# Patient Record
Sex: FEMALE | Race: WHITE | NOT HISPANIC OR LATINO | Employment: STUDENT | URBAN - METROPOLITAN AREA
[De-identification: names, ages, dates, MRNs, and addresses within clinical notes are randomized per-mention and may not be internally consistent; named-entity substitution may affect disease eponyms.]

---

## 2022-07-20 ENCOUNTER — LAB VISIT (OUTPATIENT)
Dept: LAB | Facility: OTHER | Age: 22
End: 2022-07-20
Attending: NURSE PRACTITIONER
Payer: MEDICAID

## 2022-07-20 ENCOUNTER — PATIENT MESSAGE (OUTPATIENT)
Dept: OBSTETRICS AND GYNECOLOGY | Facility: CLINIC | Age: 22
End: 2022-07-20

## 2022-07-20 ENCOUNTER — OFFICE VISIT (OUTPATIENT)
Dept: OBSTETRICS AND GYNECOLOGY | Facility: CLINIC | Age: 22
End: 2022-07-20
Payer: MEDICAID

## 2022-07-20 VITALS
HEIGHT: 67 IN | DIASTOLIC BLOOD PRESSURE: 87 MMHG | SYSTOLIC BLOOD PRESSURE: 137 MMHG | WEIGHT: 222.69 LBS | BODY MASS INDEX: 34.95 KG/M2

## 2022-07-20 DIAGNOSIS — R53.83 FATIGUE, UNSPECIFIED TYPE: ICD-10-CM

## 2022-07-20 DIAGNOSIS — N92.1 MENORRHAGIA WITH IRREGULAR CYCLE: ICD-10-CM

## 2022-07-20 DIAGNOSIS — E66.9 OBESITY (BMI 30.0-34.9): ICD-10-CM

## 2022-07-20 DIAGNOSIS — R11.0 NAUSEA: ICD-10-CM

## 2022-07-20 DIAGNOSIS — L65.9 THINNING HAIR: ICD-10-CM

## 2022-07-20 DIAGNOSIS — E28.2 PCOS (POLYCYSTIC OVARIAN SYNDROME): ICD-10-CM

## 2022-07-20 DIAGNOSIS — N94.6 DYSMENORRHEA: Primary | ICD-10-CM

## 2022-07-20 LAB
ALBUMIN SERPL BCP-MCNC: 4.1 G/DL (ref 3.5–5.2)
ALP SERPL-CCNC: 93 U/L (ref 55–135)
ALT SERPL W/O P-5'-P-CCNC: 28 U/L (ref 10–44)
ANION GAP SERPL CALC-SCNC: 10 MMOL/L (ref 8–16)
AST SERPL-CCNC: 18 U/L (ref 10–40)
BASOPHILS # BLD AUTO: 0.04 K/UL (ref 0–0.2)
BASOPHILS NFR BLD: 0.8 % (ref 0–1.9)
BILIRUB SERPL-MCNC: 0.5 MG/DL (ref 0.1–1)
BUN SERPL-MCNC: 8 MG/DL (ref 6–20)
CALCIUM SERPL-MCNC: 9.6 MG/DL (ref 8.7–10.5)
CHLORIDE SERPL-SCNC: 106 MMOL/L (ref 95–110)
CO2 SERPL-SCNC: 25 MMOL/L (ref 23–29)
CREAT SERPL-MCNC: 0.8 MG/DL (ref 0.5–1.4)
DIFFERENTIAL METHOD: NORMAL
EOSINOPHIL # BLD AUTO: 0.3 K/UL (ref 0–0.5)
EOSINOPHIL NFR BLD: 5.7 % (ref 0–8)
ERYTHROCYTE [DISTWIDTH] IN BLOOD BY AUTOMATED COUNT: 12.7 % (ref 11.5–14.5)
EST. GFR  (AFRICAN AMERICAN): >60 ML/MIN/1.73 M^2
EST. GFR  (NON AFRICAN AMERICAN): >60 ML/MIN/1.73 M^2
ESTIMATED AVG GLUCOSE: 100 MG/DL (ref 68–131)
GLUCOSE SERPL-MCNC: 90 MG/DL (ref 70–110)
HBA1C MFR BLD: 5.1 % (ref 4–5.6)
HCT VFR BLD AUTO: 41.7 % (ref 37–48.5)
HGB BLD-MCNC: 13.8 G/DL (ref 12–16)
IMM GRANULOCYTES # BLD AUTO: 0.01 K/UL (ref 0–0.04)
IMM GRANULOCYTES NFR BLD AUTO: 0.2 % (ref 0–0.5)
INSULIN COLLECTION INTERVAL: ABNORMAL
INSULIN SERPL-ACNC: 26.6 UU/ML
LYMPHOCYTES # BLD AUTO: 1.7 K/UL (ref 1–4.8)
LYMPHOCYTES NFR BLD: 33.4 % (ref 18–48)
MCH RBC QN AUTO: 29.6 PG (ref 27–31)
MCHC RBC AUTO-ENTMCNC: 33.1 G/DL (ref 32–36)
MCV RBC AUTO: 89 FL (ref 82–98)
MONOCYTES # BLD AUTO: 0.6 K/UL (ref 0.3–1)
MONOCYTES NFR BLD: 11.1 % (ref 4–15)
NEUTROPHILS # BLD AUTO: 2.5 K/UL (ref 1.8–7.7)
NEUTROPHILS NFR BLD: 48.8 % (ref 38–73)
NRBC BLD-RTO: 0 /100 WBC
PLATELET # BLD AUTO: 331 K/UL (ref 150–450)
PMV BLD AUTO: 10 FL (ref 9.2–12.9)
POTASSIUM SERPL-SCNC: 4.1 MMOL/L (ref 3.5–5.1)
PROT SERPL-MCNC: 8.1 G/DL (ref 6–8.4)
RBC # BLD AUTO: 4.67 M/UL (ref 4–5.4)
SODIUM SERPL-SCNC: 141 MMOL/L (ref 136–145)
T4 FREE SERPL-MCNC: 0.86 NG/DL (ref 0.71–1.51)
TSH SERPL DL<=0.005 MIU/L-ACNC: 0.78 UIU/ML (ref 0.4–4)
WBC # BLD AUTO: 5.06 K/UL (ref 3.9–12.7)

## 2022-07-20 PROCEDURE — 1160F PR REVIEW ALL MEDS BY PRESCRIBER/CLIN PHARMACIST DOCUMENTED: ICD-10-PCS | Mod: CPTII,,, | Performed by: NURSE PRACTITIONER

## 2022-07-20 PROCEDURE — 99204 OFFICE O/P NEW MOD 45 MIN: CPT | Mod: PBBFAC | Performed by: NURSE PRACTITIONER

## 2022-07-20 PROCEDURE — 99999 PR PBB SHADOW E&M-NEW PATIENT-LVL IV: CPT | Mod: PBBFAC,,, | Performed by: NURSE PRACTITIONER

## 2022-07-20 PROCEDURE — 3008F BODY MASS INDEX DOCD: CPT | Mod: CPTII,,, | Performed by: NURSE PRACTITIONER

## 2022-07-20 PROCEDURE — 99999 PR PBB SHADOW E&M-NEW PATIENT-LVL IV: ICD-10-PCS | Mod: PBBFAC,,, | Performed by: NURSE PRACTITIONER

## 2022-07-20 PROCEDURE — 83525 ASSAY OF INSULIN: CPT | Performed by: NURSE PRACTITIONER

## 2022-07-20 PROCEDURE — 3075F PR MOST RECENT SYSTOLIC BLOOD PRESS GE 130-139MM HG: ICD-10-PCS | Mod: CPTII,,, | Performed by: NURSE PRACTITIONER

## 2022-07-20 PROCEDURE — 1160F RVW MEDS BY RX/DR IN RCRD: CPT | Mod: CPTII,,, | Performed by: NURSE PRACTITIONER

## 2022-07-20 PROCEDURE — 84443 ASSAY THYROID STIM HORMONE: CPT | Performed by: NURSE PRACTITIONER

## 2022-07-20 PROCEDURE — 3079F PR MOST RECENT DIASTOLIC BLOOD PRESSURE 80-89 MM HG: ICD-10-PCS | Mod: CPTII,,, | Performed by: NURSE PRACTITIONER

## 2022-07-20 PROCEDURE — 85025 COMPLETE CBC W/AUTO DIFF WBC: CPT | Performed by: NURSE PRACTITIONER

## 2022-07-20 PROCEDURE — 36415 COLL VENOUS BLD VENIPUNCTURE: CPT | Performed by: NURSE PRACTITIONER

## 2022-07-20 PROCEDURE — 3079F DIAST BP 80-89 MM HG: CPT | Mod: CPTII,,, | Performed by: NURSE PRACTITIONER

## 2022-07-20 PROCEDURE — 3075F SYST BP GE 130 - 139MM HG: CPT | Mod: CPTII,,, | Performed by: NURSE PRACTITIONER

## 2022-07-20 PROCEDURE — 99203 PR OFFICE/OUTPT VISIT, NEW, LEVL III, 30-44 MIN: ICD-10-PCS | Mod: S$PBB,,, | Performed by: NURSE PRACTITIONER

## 2022-07-20 PROCEDURE — 80053 COMPREHEN METABOLIC PANEL: CPT | Performed by: NURSE PRACTITIONER

## 2022-07-20 PROCEDURE — 83036 HEMOGLOBIN GLYCOSYLATED A1C: CPT | Performed by: NURSE PRACTITIONER

## 2022-07-20 PROCEDURE — 1159F MED LIST DOCD IN RCRD: CPT | Mod: CPTII,,, | Performed by: NURSE PRACTITIONER

## 2022-07-20 PROCEDURE — 84439 ASSAY OF FREE THYROXINE: CPT | Performed by: NURSE PRACTITIONER

## 2022-07-20 PROCEDURE — 99203 OFFICE O/P NEW LOW 30 MIN: CPT | Mod: S$PBB,,, | Performed by: NURSE PRACTITIONER

## 2022-07-20 PROCEDURE — 1159F PR MEDICATION LIST DOCUMENTED IN MEDICAL RECORD: ICD-10-PCS | Mod: CPTII,,, | Performed by: NURSE PRACTITIONER

## 2022-07-20 PROCEDURE — 3008F PR BODY MASS INDEX (BMI) DOCUMENTED: ICD-10-PCS | Mod: CPTII,,, | Performed by: NURSE PRACTITIONER

## 2022-07-20 RX ORDER — IBUPROFEN 600 MG/1
600 TABLET ORAL 3 TIMES DAILY
COMMUNITY
End: 2022-11-09

## 2022-07-20 RX ORDER — CETIRIZINE HYDROCHLORIDE 10 MG/1
TABLET ORAL
COMMUNITY
End: 2022-11-18 | Stop reason: SDUPTHER

## 2022-07-20 RX ORDER — ACETAMINOPHEN 325 MG/1
325 TABLET ORAL EVERY 6 HOURS PRN
COMMUNITY

## 2022-07-20 RX ORDER — ONDANSETRON 4 MG/1
8 TABLET, ORALLY DISINTEGRATING ORAL EVERY 6 HOURS PRN
Qty: 20 TABLET | Refills: 0 | Status: SHIPPED | OUTPATIENT
Start: 2022-07-20 | End: 2022-11-09 | Stop reason: SDUPTHER

## 2022-07-20 RX ORDER — DROSPIRENONE AND ETHINYL ESTRADIOL 0.02-3(28)
1 KIT ORAL DAILY
Qty: 90 TABLET | Refills: 1 | Status: SHIPPED | OUTPATIENT
Start: 2022-07-20 | End: 2022-08-11

## 2022-07-20 NOTE — PROGRESS NOTES
Rosalva Beal is a 21 y.o. female , name is billy Torres, presents with several complaints- priority is recent PCOS diagnosis along with painful and heavy periods. New pt- recently saw a Dr. Krishnan in [place] with primary care. Reports being officially diagnosed with PCOS at this visit. She has never received a pelvic US- had some prior insurance issues but now has medicaid. Reports irregular cycles but when they do come are very heavy and painful. LMP was in May. She brought a typed up list of complaints with her today- gets nervous at visits and sometimes forgets to mention things. Reports nausea with periods, hair thinning, fatigue, pain with bowel movements and sometimes with sex, diarrhea with period, hirsutism (shaves face),dark skin patches, weight fluctuation despite diet (current BMI 33 which is her heaviest, normally fluctuates from 140-170#). Last doctor started her on metformin but nausea was severe so she discontinued it. History of depression and anxiety- currently on Cymbalta. Sometimes she has HA during period. Reports acne.     She is SA with one female partner. Denies past history of STDs. She has never been pregnant before. She has never been on contraception before but is open to ocps if it would help her pain and heavy period flow. Does not think taking a daily pill would be an issue.     Originally from [place], lives here now. Walks to work every day. Twin sister was diagnosed with PCOS too.     History reviewed. No pertinent past medical history.  History reviewed. No pertinent surgical history.  Social History     Tobacco Use    Smoking status: Never Smoker    Smokeless tobacco: Never Used   Substance Use Topics    Alcohol use: Never    Drug use: Never     History reviewed. No pertinent family history.  OB History    Para Term  AB Living   0 0 0 0 0 0   SAB IAB Ectopic Multiple Live Births   0 0 0 0 0     ROS:  GENERAL: No fever, chills, fatigability or  weight gain, fatigue  VULVAR: No pain, no lesions and no itching.  VAGINAL: No relaxation, no itching, no discharge, no abnormal bleeding and no lesions.  ABDOMEN: lower abdomen pain with sex and period; Nausea,  Denies vomiting. + diarrhea. No constipation  BREAST: Denies pain. No lumps. No discharge.  URINARY: No incontinence, no nocturia, no frequency and no dysuria.  CARDIOVASCULAR: No chest pain. No shortness of breath. No leg cramps.  NEUROLOGICAL: HAs with period; No vision changes.    PHYSICAL EXAM:  TALK ONLY    ASSESSMENT and PLAN:    ICD-10-CM ICD-9-CM    1. Dysmenorrhea  N94.6 625.3 US Pelvis Comp with Transvag NON-OB (xpd   2. PCOS (polycystic ovarian syndrome)  E28.2 256.4 Ambulatory referral/consult to Nutrition Services      Ambulatory referral/consult to Endocrinology      TSH      T4, FREE      Hemoglobin A1C      Insulin, random      Comprehensive Metabolic Panel      CBC Auto Differential      drospirenone-ethinyl estradioL (KM) 3-0.02 mg per tablet   3. Obesity (BMI 30.0-34.9)  E66.9 278.00 Ambulatory referral/consult to Nutrition Services      TSH      T4, FREE      Hemoglobin A1C      Insulin, random      Comprehensive Metabolic Panel      CBC Auto Differential   4. Menorrhagia with irregular cycle  N92.1 626.2 US Pelvis Comp with Transvag NON-OB (xpd      TSH      T4, FREE      Hemoglobin A1C      Insulin, random      Comprehensive Metabolic Panel      CBC Auto Differential      drospirenone-ethinyl estradioL (KM) 3-0.02 mg per tablet   5. Fatigue, unspecified type  R53.83 780.79 TSH      T4, FREE      Hemoglobin A1C      Insulin, random      Comprehensive Metabolic Panel      CBC Auto Differential   6. Thinning hair  L65.9 704.00 TSH      T4, FREE      Hemoglobin A1C      Insulin, random      Comprehensive Metabolic Panel      CBC Auto Differential   7. Nausea  R11.0 787.02 ondansetron (ZOFRAN-ODT) 4 MG TbDL         1. Labs today  2. Endocrine and nutrition referral  3. Establish care with  primary at [facility]  4. Pelvic US  5. Rx Zofran for nausea  6. Reviewed PCOS diagnosis and that she does meet the criteria regardless of what US shows  7. Pt chooses to start ocps  8. Release of records submitted today    The use of the oral contraceptive has been fully discussed with the patient. This includes the proper method to initiate and continue the pills, the need for regular compliance to ensure adequate contraceptive effect, the physiology which make the pill effective, the instructions for what to do in event of a missed pill, and warnings about anticipated minor side effects such as breakthrough spotting, nausea, breast tenderness, weight changes, acne, headaches, etc.  She has been told of the more serious potential side effects such as MI, stroke, and deep vein thrombosis, all of which are very unlikely.  She has been asked to report any signs of such serious problems immediately.  She should back up the pill with a condom during any cycle in which antibiotics are prescribed, and during the first cycle as well. The need for additional protection, such as a condom, to prevent exposure to sexually transmitted diseases has also been discussed- the patient has been clearly reminded that OCP's cannot protect her against diseases such as HIV and others. She understands and wishes to take the medication as prescribed.     The patient was counseled today on PCOS. We discussed that in any woman with oligomenorrhea/oligoovulation, other causes of irregular menses should be investigated. Testing should include human chorionic gonadotropin (hCG), prolactin, thyroid-stimulating hormone (TSH), and follicle-stimulating hormone (FSH). Transvaginal ultrasound (TVUS) is performed in some women to determine if they have polycystic ovarian morphology (PCOM, eg, the appearance of PCOS on ultrasound). However, not all women with possible PCOS undergo ultrasound. If the patient has both oligomenorrhea and evidence of  hyperandrogenism and causes other than PCOS have been ruled out, she meets criteria for the diagnosis of PCOS, and an ultrasound is not necessary. Two out of three of the following criteria are required to make the diagnosis 1. Oligo- and/or anovulation 2. Clinical and/or biochemical signs of hyperandrogenism 3. Polycystic ovaries (by ultrasound)    Women are sometimes referred for PCOS based upon the incidental finding of cystic ovaries on pelvic ultrasound or other abdominal imaging. If there are no other clinical features of PCOS, no further evaluation is needed.    25 minutes spent face to face with patient    FU WITH ME IN 3-4 MONTHS

## 2022-07-21 ENCOUNTER — TELEPHONE (OUTPATIENT)
Dept: OBSTETRICS AND GYNECOLOGY | Facility: CLINIC | Age: 22
End: 2022-07-21
Payer: MEDICAID

## 2022-07-21 ENCOUNTER — PATIENT MESSAGE (OUTPATIENT)
Dept: ADMINISTRATIVE | Facility: OTHER | Age: 22
End: 2022-07-21
Payer: MEDICAID

## 2022-08-01 ENCOUNTER — HOSPITAL ENCOUNTER (EMERGENCY)
Facility: OTHER | Age: 22
Discharge: HOME | End: 2022-08-01
Attending: EMERGENCY MEDICINE
Payer: MEDICAID

## 2022-08-01 ENCOUNTER — HOSPITAL ENCOUNTER (OUTPATIENT)
Dept: RADIOLOGY | Facility: OTHER | Age: 22
Discharge: HOME | End: 2022-08-01
Attending: NURSE PRACTITIONER
Payer: MEDICAID

## 2022-08-01 VITALS
DIASTOLIC BLOOD PRESSURE: 87 MMHG | HEIGHT: 68 IN | OXYGEN SATURATION: 96 % | TEMPERATURE: 99 F | BODY MASS INDEX: 27.28 KG/M2 | HEART RATE: 84 BPM | RESPIRATION RATE: 20 BRPM | SYSTOLIC BLOOD PRESSURE: 130 MMHG | WEIGHT: 180 LBS

## 2022-08-01 DIAGNOSIS — K59.03 DRUG-INDUCED CONSTIPATION: ICD-10-CM

## 2022-08-01 DIAGNOSIS — R55 SYNCOPE: Primary | ICD-10-CM

## 2022-08-01 DIAGNOSIS — N92.1 MENORRHAGIA WITH IRREGULAR CYCLE: ICD-10-CM

## 2022-08-01 DIAGNOSIS — N94.6 DYSMENORRHEA: ICD-10-CM

## 2022-08-01 LAB
B-HCG UR QL: NEGATIVE
CTP QC/QA: YES

## 2022-08-01 PROCEDURE — 25000003 PHARM REV CODE 250: Performed by: EMERGENCY MEDICINE

## 2022-08-01 PROCEDURE — 82272 OCCULT BLD FECES 1-3 TESTS: CPT

## 2022-08-01 PROCEDURE — 93005 ELECTROCARDIOGRAM TRACING: CPT

## 2022-08-01 PROCEDURE — 81025 URINE PREGNANCY TEST: CPT | Performed by: NURSE PRACTITIONER

## 2022-08-01 PROCEDURE — 93010 ELECTROCARDIOGRAM REPORT: CPT | Mod: ,,, | Performed by: INTERNAL MEDICINE

## 2022-08-01 PROCEDURE — 99283 EMERGENCY DEPT VISIT LOW MDM: CPT | Mod: 25

## 2022-08-01 PROCEDURE — 93010 EKG 12-LEAD: ICD-10-PCS | Mod: ,,, | Performed by: INTERNAL MEDICINE

## 2022-08-01 RX ORDER — SYRING-NEEDL,DISP,INSUL,0.3 ML 29 G X1/2"
296 SYRINGE, EMPTY DISPOSABLE MISCELLANEOUS
Status: COMPLETED | OUTPATIENT
Start: 2022-08-01 | End: 2022-08-01

## 2022-08-01 RX ORDER — BISACODYL 5 MG
5 TABLET, DELAYED RELEASE (ENTERIC COATED) ORAL
Status: COMPLETED | OUTPATIENT
Start: 2022-08-01 | End: 2022-08-01

## 2022-08-01 RX ADMIN — BISACODYL 5 MG: 5 TABLET, COATED ORAL at 03:08

## 2022-08-01 RX ADMIN — Medication 296 ML: at 03:08

## 2022-08-01 NOTE — ED PROVIDER NOTES
Encounter Date: 8/1/2022    SCRIBE #1 NOTE: I, Latoya Hassan, am scribing for, and in the presence of, Elizabeth Christopher MD.       History     Chief Complaint   Patient presents with    Loss of Consciousness    Constipation     Reports that she was giving herself an enema in the imaging center and passed out while trying to have a BM; c/o abd and rectal pain     Time seen by provider: 2:06 PM    This is a 21 y.o. female who presents s/p syncopal episode which occurred at the outpatient imaging center just prior to arrival. Patient explains that she has been experiencing constipation for the past 3 days with abdominal discomfort. She typically has a bowel movement every day. She notes that she gave herself an enema while waiting at the imaging center for her appointment as she had a transvaginal ultrasound scheduled and wanted to relieve the abdominal discomfort. While at the imaging center, she was in the bathroom straining to have a bowel movement when she lost consciousness briefly. She reports that she did fall off the toilet but denies head trauma.  She denies headache, neck pain, back pain, nausea or vomiting, or blurred vision. She reports having a small bowel movement once here in the ED. Of note, the patient has been taking Zofran for the past 3 days because she has been nauseous due to starting a new birth control. She has PMHx of depression and PCOS. She has not had any abdominal surgeries. She denies any chance of pregnancy.     This is the extent of the patient's complaints at this time.    The history is provided by the patient.     Review of patient's allergies indicates:   Allergen Reactions    Egg derived Anaphylaxis, Anxiety, Blisters, Dermatitis, Diarrhea, Edema, Hallucinations, Itching, Nausea And Vomiting, Palpitations, Photosensitivity, Rash, Shortness Of Breath, Swelling and Tinitus     No past medical history on file.  No past surgical history on file.  No family history on file.  Social  History     Tobacco Use    Smoking status: Never Smoker    Smokeless tobacco: Never Used   Substance Use Topics    Alcohol use: Never    Drug use: Never     Review of Systems   Constitutional: Negative for chills and fever.   HENT: Negative for congestion and sore throat.    Eyes: Negative for visual disturbance.   Respiratory: Negative for cough and shortness of breath.    Cardiovascular: Negative for chest pain and palpitations.   Gastrointestinal: Positive for abdominal pain and constipation. Negative for diarrhea and vomiting.   Genitourinary: Negative for decreased urine volume, dysuria and vaginal discharge.   Musculoskeletal: Negative for back pain, joint swelling, neck pain and neck stiffness.   Skin: Negative for rash and wound.   Neurological: Positive for syncope. Negative for weakness, numbness and headaches.   Psychiatric/Behavioral: Negative for confusion.       Physical Exam     Initial Vitals [08/01/22 1134]   BP Pulse Resp Temp SpO2   (!) 142/96 102 18 98.6 °F (37 °C) 98 %      MAP       --         Physical Exam    Nursing note and vitals reviewed.  Constitutional: She appears well-developed and well-nourished. No distress.   HENT:   Head: Normocephalic and atraumatic.   Mouth/Throat: Oropharynx is clear and moist. No oropharyngeal exudate.   Eyes: Conjunctivae and EOM are normal. Pupils are equal, round, and reactive to light.   Neck: Neck supple.   Normal range of motion.  Cardiovascular: Normal rate and normal heart sounds.   No murmur heard.  Pulmonary/Chest: Breath sounds normal. No respiratory distress. She has no wheezes. She has no rhonchi. She has no rales.   Abdominal: Abdomen is soft. There is no abdominal tenderness. There is no rebound and no guarding.   Genitourinary:    Genitourinary Comments: No external hemorrhoids. No fecal impaction. Scant brown stool, hemoccult negative.      Musculoskeletal:         General: No tenderness or edema.      Cervical back: Normal range of motion  and neck supple.     Neurological: She is alert and oriented to person, place, and time. She has normal strength. GCS score is 15. GCS eye subscore is 4. GCS verbal subscore is 5. GCS motor subscore is 6.   Skin: Skin is warm and dry. No rash noted.   Psychiatric: She has a normal mood and affect. Thought content normal.         ED Course   Procedures  Labs Reviewed   POCT URINE PREGNANCY   POCT GLUCOSE MONITORING CONTINUOUS     EKG Readings: (Independently Interpreted)   Rhythm: Normal Sinus Rhythm. Heart Rate: 78.   No STEMI. No ectopy. Incomplete right bundle branch block present.     ECG Results          EKG 12-lead (In process)  Result time 08/01/22 14:42:20    In process by Interface, Lab In [facility] (08/01/22 14:42:20)                 Narrative:    Test Reason : R55,    Vent. Rate : 078 BPM     Atrial Rate : 078 BPM     P-R Int : 122 ms          QRS Dur : 104 ms      QT Int : 376 ms       P-R-T Axes : -03 050 033 degrees     QTc Int : 428 ms    Normal sinus rhythm  Incomplete right bundle branch block  Borderline Abnormal ECG      Referred By: [email or web address]   SELF           Confirmed By:                             Imaging Results    None          Medications   magnesium citrate solution 296 mL (296 mLs Oral Given 8/1/22 1527)   bisacodyL EC tablet 5 mg (5 mg Oral Given 8/1/22 1550)     Medical Decision Making:   History:   Old Medical Records: I decided to obtain old medical records.  Independently Interpreted Test(s):   I have ordered and independently interpreted EKG Reading(s) - see prior notes  Clinical Tests:   Lab Tests: Ordered and Reviewed  Medical Tests: Ordered and Reviewed  ED Management:  Emergent evaluation a 21-year-old female who presents after a syncopal episode.  Syncope occurred while straining for a bowel movement, recent constipation.  Vital signs are benign, afebrile.  Physical exam is benign.  There is no fecal impaction on rectal exam.  EKG shows normal sinus rhythm with an incomplete right  bundle-branch block, but no concerning findings of dysrhythmia or ischemia.  She states she is back to her baseline now.  She is not hypoglycemic or hypothermic.  She was treated here with magnesium citrate and Dulcolax, discharged in good condition.  I suspect constipation may be related to recent Zofran use.  She is advised to discontinue this medication and increase fluids and fiber in the diet.  She is discharged in good condition be given strict return precautions.  I suspect vasovagal syncope related to constipation.          Scribe Attestation:   Scribe #1: I performed the above scribed service and the documentation accurately describes the services I performed. I attest to the accuracy of the note.              Physician Attestation for Scribe: I, Elizabeth Christopher, reviewed documentation as scribed in my presence, which is both accurate and complete.      Clinical Impression:   Final diagnoses:  [R55] Syncope (Primary)  [K59.03] Drug-induced constipation          ED Disposition Condition    Discharge Stable        ED Prescriptions     None        Follow-up Information     Follow up With Specialties Details Why Contact Info    [facility] Emergency Dept Emergency Medicine  As needed, If symptoms worsen [address]  [facility] [id number] [phone]    Your regular doctor   for  close follow-up and symptom recheck            Elizabeth Christopher MD  08/01/22 0684

## 2022-08-01 NOTE — ED NOTES
Pt awake and alert; resting quietly on stretcher. Pt remains on continuous pulse ox monitoring with non-invasive blood pressure to cycle every 30 minutes. No acute distress or discomfort reported or observed. Pt able to reposition self on stretcher. Bed locked in lowest position; side rails up and locked x 2; call light, bedside table, and personal belongings within reach. Room assessed for safety measures and cleanliness; no action needed at this time. Plan of care discussed. Pt instructed to alert nurse for assistance and before attempting to get out of bed; verbalizes understanding. Pt denies needs or complaints at this time; will continue to monitor. Friend at bedside.

## 2022-08-01 NOTE — FIRST PROVIDER EVALUATION
"Medical screening exam completed.  I have conducted a focused provider triage encounter, findings are as follows:    Brief history of present illness:  Rapid response. She was scheduled for TVUS. Extremely constipated so gave herself and enema to try to go. While she was pushing she strained too hard, passed out and ended up on the floor. She does not think she hit her head. No headache. C/o abdominal cramping and nausea    Vitals:    08/01/22 1134   BP: (!) 142/96   Pulse: 102   Resp: 18   Temp: 98.6 °F (37 °C)   TempSrc: Oral   SpO2: 98%   Weight: 81.6 kg (180 lb)   Height: 5' 8" (1.727 m)       Pertinent physical exam:  Mild generalized abd tenderness    Brief workup plan:  KUB    Preliminary workup initiated; this workup will be continued and followed by the physician or advanced practice provider that is assigned to the patient when roomed.  "

## 2022-08-01 NOTE — ED NOTES
Pt awake and alert; resting quietly on stretcher. Pt remains on continuous pulse ox monitoring with non-invasive blood pressure to cycle every 30 minutes. No acute distress or discomfort reported or observed. Pt able to reposition self on stretcher. Bed locked in lowest position; side rails up and locked x 2; call light, bedside table, and personal belongings within reach. Room assessed for safety measures and cleanliness; no action needed at this time. Plan of care discussed. Pt instructed to alert nurse for assistance and before attempting to get out of bed; verbalizes understanding. Pt denies needs or complaints at this time; will continue to monitor.

## 2022-08-01 NOTE — ED TRIAGE NOTES
Pt presents to the ED w/ c/o LOC after giving herself an enema in the imaging center. Pt reporting constipation, abdominal pain, and rectal pain.

## 2022-08-03 ENCOUNTER — PATIENT MESSAGE (OUTPATIENT)
Dept: OBSTETRICS AND GYNECOLOGY | Facility: CLINIC | Age: 22
End: 2022-08-03
Payer: MEDICAID

## 2022-08-04 ENCOUNTER — HOSPITAL ENCOUNTER (OUTPATIENT)
Dept: RADIOLOGY | Facility: OTHER | Age: 22
Discharge: HOME | End: 2022-08-04
Attending: NURSE PRACTITIONER
Payer: COMMERCIAL

## 2022-08-04 PROCEDURE — 76856 US EXAM PELVIC COMPLETE: CPT | Mod: 26,,, | Performed by: RADIOLOGY

## 2022-08-04 PROCEDURE — 76830 US PELVIS COMP WITH TRANSVAG NON-OB (XPD): ICD-10-PCS | Mod: 26,,, | Performed by: RADIOLOGY

## 2022-08-04 PROCEDURE — 76830 TRANSVAGINAL US NON-OB: CPT | Mod: TC

## 2022-08-04 PROCEDURE — 76830 TRANSVAGINAL US NON-OB: CPT | Mod: 26,,, | Performed by: RADIOLOGY

## 2022-08-04 PROCEDURE — 76856 US PELVIS COMP WITH TRANSVAG NON-OB (XPD): ICD-10-PCS | Mod: 26,,, | Performed by: RADIOLOGY

## 2022-08-11 LAB — POCT GLUCOSE: 79 MG/DL (ref 70–110)

## 2022-08-16 ENCOUNTER — TELEPHONE (OUTPATIENT)
Dept: ADMINISTRATIVE | Facility: OTHER | Age: 22
End: 2022-08-16
Payer: COMMERCIAL

## 2022-08-16 NOTE — TELEPHONE ENCOUNTER
LM for patient to contact our scheduling office to discuss rescheduling Nutrition  appointment of 8-23-22. Contact number provided.

## 2022-09-06 ENCOUNTER — IMMUNIZATION (OUTPATIENT)
Dept: PHARMACY | Facility: CLINIC | Age: 22
End: 2022-09-06
Payer: COMMERCIAL

## 2022-11-08 ENCOUNTER — HOSPITAL ENCOUNTER (EMERGENCY)
Facility: OTHER | Age: 22
Discharge: HOME | End: 2022-11-09
Attending: EMERGENCY MEDICINE
Payer: COMMERCIAL

## 2022-11-08 DIAGNOSIS — R10.2 PELVIC PAIN: ICD-10-CM

## 2022-11-08 DIAGNOSIS — R11.0 NAUSEA: ICD-10-CM

## 2022-11-08 DIAGNOSIS — N92.1 MENORRHAGIA WITH IRREGULAR CYCLE: ICD-10-CM

## 2022-11-08 DIAGNOSIS — N94.6 DYSMENORRHEA: Primary | ICD-10-CM

## 2022-11-08 DIAGNOSIS — R10.30 LOWER ABDOMINAL PAIN: ICD-10-CM

## 2022-11-08 DIAGNOSIS — R19.7 NAUSEA VOMITING AND DIARRHEA: ICD-10-CM

## 2022-11-08 DIAGNOSIS — R11.2 NAUSEA VOMITING AND DIARRHEA: ICD-10-CM

## 2022-11-08 DIAGNOSIS — M54.9 BACK PAIN, UNSPECIFIED BACK LOCATION, UNSPECIFIED BACK PAIN LATERALITY, UNSPECIFIED CHRONICITY: ICD-10-CM

## 2022-11-08 PROCEDURE — 96374 THER/PROPH/DIAG INJ IV PUSH: CPT

## 2022-11-08 PROCEDURE — 96375 TX/PRO/DX INJ NEW DRUG ADDON: CPT

## 2022-11-08 PROCEDURE — 81025 URINE PREGNANCY TEST: CPT | Performed by: EMERGENCY MEDICINE

## 2022-11-08 PROCEDURE — 96361 HYDRATE IV INFUSION ADD-ON: CPT

## 2022-11-08 PROCEDURE — 99284 EMERGENCY DEPT VISIT MOD MDM: CPT | Mod: 25

## 2022-11-08 NOTE — Clinical Note
"Rosalva Barron"Emigdio was seen and treated in our emergency department on 11/8/2022.  She may return to school on 11/10/2022.      If you have any questions or concerns, please don't hesitate to call.      Fanny Osullivan MD"

## 2022-11-09 VITALS
OXYGEN SATURATION: 99 % | TEMPERATURE: 99 F | HEART RATE: 69 BPM | DIASTOLIC BLOOD PRESSURE: 65 MMHG | RESPIRATION RATE: 16 BRPM | SYSTOLIC BLOOD PRESSURE: 125 MMHG

## 2022-11-09 LAB
ALBUMIN SERPL BCP-MCNC: 4 G/DL (ref 3.5–5.2)
ALP SERPL-CCNC: 79 U/L (ref 55–135)
ALT SERPL W/O P-5'-P-CCNC: 30 U/L (ref 10–44)
AMORPH CRY URNS QL MICRO: ABNORMAL
ANION GAP SERPL CALC-SCNC: 7 MMOL/L (ref 8–16)
AST SERPL-CCNC: 23 U/L (ref 10–40)
B-HCG UR QL: NEGATIVE
BACTERIA #/AREA URNS HPF: ABNORMAL /HPF
BASOPHILS # BLD AUTO: 0.05 K/UL (ref 0–0.2)
BASOPHILS NFR BLD: 0.5 % (ref 0–1.9)
BILIRUB SERPL-MCNC: 0.4 MG/DL (ref 0.1–1)
BILIRUB UR QL STRIP: NEGATIVE
BUN SERPL-MCNC: 8 MG/DL (ref 6–20)
CALCIUM SERPL-MCNC: 9.4 MG/DL (ref 8.7–10.5)
CHLORIDE SERPL-SCNC: 108 MMOL/L (ref 95–110)
CLARITY UR: CLEAR
CO2 SERPL-SCNC: 22 MMOL/L (ref 23–29)
COLOR UR: YELLOW
CREAT SERPL-MCNC: 0.8 MG/DL (ref 0.5–1.4)
CTP QC/QA: YES
DIFFERENTIAL METHOD: ABNORMAL
EOSINOPHIL # BLD AUTO: 0.1 K/UL (ref 0–0.5)
EOSINOPHIL NFR BLD: 1.4 % (ref 0–8)
ERYTHROCYTE [DISTWIDTH] IN BLOOD BY AUTOMATED COUNT: 13.2 % (ref 11.5–14.5)
EST. GFR  (NO RACE VARIABLE): >60 ML/MIN/1.73 M^2
GLUCOSE SERPL-MCNC: 99 MG/DL (ref 70–110)
GLUCOSE UR QL STRIP: NEGATIVE
HCT VFR BLD AUTO: 38 % (ref 37–48.5)
HCV AB SERPL QL IA: NEGATIVE
HGB BLD-MCNC: 13.1 G/DL (ref 12–16)
HGB UR QL STRIP: ABNORMAL
HIV 1+2 AB+HIV1 P24 AG SERPL QL IA: NEGATIVE
IMM GRANULOCYTES # BLD AUTO: 0.03 K/UL (ref 0–0.04)
IMM GRANULOCYTES NFR BLD AUTO: 0.3 % (ref 0–0.5)
KETONES UR QL STRIP: NEGATIVE
LEUKOCYTE ESTERASE UR QL STRIP: NEGATIVE
LIPASE SERPL-CCNC: 14 U/L (ref 4–60)
LYMPHOCYTES # BLD AUTO: 1.4 K/UL (ref 1–4.8)
LYMPHOCYTES NFR BLD: 14.1 % (ref 18–48)
MCH RBC QN AUTO: 29.6 PG (ref 27–31)
MCHC RBC AUTO-ENTMCNC: 34.5 G/DL (ref 32–36)
MCV RBC AUTO: 86 FL (ref 82–98)
MICROSCOPIC COMMENT: ABNORMAL
MONOCYTES # BLD AUTO: 0.8 K/UL (ref 0.3–1)
MONOCYTES NFR BLD: 8.3 % (ref 4–15)
NEUTROPHILS # BLD AUTO: 7.6 K/UL (ref 1.8–7.7)
NEUTROPHILS NFR BLD: 75.4 % (ref 38–73)
NITRITE UR QL STRIP: NEGATIVE
NRBC BLD-RTO: 0 /100 WBC
PH UR STRIP: 8.5 [PH] (ref 5–8)
PLATELET # BLD AUTO: 354 K/UL (ref 150–450)
PMV BLD AUTO: 9.9 FL (ref 9.2–12.9)
POTASSIUM SERPL-SCNC: 4 MMOL/L (ref 3.5–5.1)
PROT SERPL-MCNC: 7.8 G/DL (ref 6–8.4)
PROT UR QL STRIP: NEGATIVE
RBC # BLD AUTO: 4.43 M/UL (ref 4–5.4)
RBC #/AREA URNS HPF: 75 /HPF (ref 0–4)
SODIUM SERPL-SCNC: 137 MMOL/L (ref 136–145)
SP GR UR STRIP: 1.01 (ref 1–1.03)
URN SPEC COLLECT METH UR: ABNORMAL
UROBILINOGEN UR STRIP-ACNC: NEGATIVE EU/DL
WBC # BLD AUTO: 10.04 K/UL (ref 3.9–12.7)

## 2022-11-09 PROCEDURE — 81000 URINALYSIS NONAUTO W/SCOPE: CPT | Performed by: EMERGENCY MEDICINE

## 2022-11-09 PROCEDURE — 63600175 PHARM REV CODE 636 W HCPCS: Performed by: EMERGENCY MEDICINE

## 2022-11-09 PROCEDURE — 86803 HEPATITIS C AB TEST: CPT | Performed by: EMERGENCY MEDICINE

## 2022-11-09 PROCEDURE — 83690 ASSAY OF LIPASE: CPT | Performed by: EMERGENCY MEDICINE

## 2022-11-09 PROCEDURE — 80053 COMPREHEN METABOLIC PANEL: CPT | Performed by: EMERGENCY MEDICINE

## 2022-11-09 PROCEDURE — 87389 HIV-1 AG W/HIV-1&-2 AB AG IA: CPT | Performed by: EMERGENCY MEDICINE

## 2022-11-09 PROCEDURE — 85025 COMPLETE CBC W/AUTO DIFF WBC: CPT | Performed by: EMERGENCY MEDICINE

## 2022-11-09 PROCEDURE — 25000003 PHARM REV CODE 250: Performed by: EMERGENCY MEDICINE

## 2022-11-09 RX ORDER — ONDANSETRON 4 MG/1
4 TABLET, ORALLY DISINTEGRATING ORAL EVERY 4 HOURS PRN
Qty: 20 TABLET | Refills: 1 | Status: SHIPPED | OUTPATIENT
Start: 2022-11-09

## 2022-11-09 RX ORDER — IBUPROFEN 600 MG/1
600 TABLET ORAL EVERY 6 HOURS PRN
Qty: 20 TABLET | Refills: 1 | Status: SHIPPED | OUTPATIENT
Start: 2022-11-09

## 2022-11-09 RX ORDER — ONDANSETRON 2 MG/ML
4 INJECTION INTRAMUSCULAR; INTRAVENOUS
Status: COMPLETED | OUTPATIENT
Start: 2022-11-09 | End: 2022-11-09

## 2022-11-09 RX ORDER — KETOROLAC TROMETHAMINE 30 MG/ML
30 INJECTION, SOLUTION INTRAMUSCULAR; INTRAVENOUS
Status: COMPLETED | OUTPATIENT
Start: 2022-11-09 | End: 2022-11-09

## 2022-11-09 RX ORDER — IBUPROFEN 600 MG/1
600 TABLET ORAL EVERY 6 HOURS PRN
Qty: 201 TABLET | Refills: 1 | Status: SHIPPED | OUTPATIENT
Start: 2022-11-09 | End: 2022-11-09 | Stop reason: SDUPTHER

## 2022-11-09 RX ORDER — CALCIUM CARBONATE 200(500)MG
1000 TABLET,CHEWABLE ORAL
Status: COMPLETED | OUTPATIENT
Start: 2022-11-09 | End: 2022-11-09

## 2022-11-09 RX ADMIN — KETOROLAC TROMETHAMINE 30 MG: 30 INJECTION, SOLUTION INTRAMUSCULAR; INTRAVENOUS at 01:11

## 2022-11-09 RX ADMIN — CALCIUM CARBONATE (ANTACID) CHEW TAB 500 MG 1000 MG: 500 CHEW TAB at 02:11

## 2022-11-09 RX ADMIN — ONDANSETRON 4 MG: 2 INJECTION INTRAMUSCULAR; INTRAVENOUS at 01:11

## 2022-11-09 RX ADMIN — SODIUM CHLORIDE 1000 ML: 0.9 INJECTION, SOLUTION INTRAVENOUS at 01:11

## 2022-11-09 NOTE — ED TRIAGE NOTES
"Pt presents to the ED with c/o lower abdominal cramping x 2 days with associated nausea. Pt reports she is on her period and having "increased bleeding than normal." Pt denies cp, sob, headache, or weakness.  "

## 2022-11-09 NOTE — ED PROVIDER NOTES
Encounter Date: 11/8/2022       History     Chief Complaint   Patient presents with    Abdominal Cramping     Secondary to menstral cramping with heavy vaginal bleeding pt reports associated nausea/vomiting     23 yo female with PCOS presents via [facility] with suprapubic abdominal pain, nausea, vomiting, diarrhea, and lightheadedness.  Patient reports chronic pelvic pain since age 16.  She has irregular cycles at baseline.  She started her menses 3 days ago and it has been heavy.  Pelvic pain became severe today, associated with nausea/vomiting x 3 episodes and multiple episodes of diarrhea.  Patient was on the toilet in her dorm when she became weak and asked roommate to call 911.  Patient reports large, fist-sized blood clots with menses.  No fevers.  Patient reports low back pain which is chronic and worse with menses; she took Tizanidine 4mg several hours ago.  She also took Tylenol 500mg PO several hours ago.  She is rx'ed Zofran 4mg ODT and took one earlier today without relief of nausea.      Patient is diagnosed with depression.  She is rx'ed Cymbalta 60mg PO qday; she was also recently started on Wellbutrin 150[facility] PO [facility]      Review of patient's allergies indicates:   Allergen Reactions    Egg derived Anaphylaxis, Anxiety, Blisters, Dermatitis, Diarrhea, Edema, Hallucinations, Itching, Nausea And Vomiting, Palpitations, Photosensitivity, Rash, Shortness Of Breath, Swelling and Tinitus     History reviewed. No pertinent past medical history.  No past surgical history on file.  No family history on file.  Social History     Tobacco Use    Smoking status: Never    Smokeless tobacco: Never   Substance Use Topics    Alcohol use: Never    Drug use: Never     Review of Systems   Constitutional:  Negative for fever.   HENT:  Negative for sore throat.    Eyes:  Negative for photophobia.   Respiratory:  Negative for shortness of breath.    Cardiovascular:  Negative for  chest pain.   Gastrointestinal:  Positive for abdominal pain, diarrhea, nausea and vomiting.   Genitourinary:  Positive for menstrual problem (irregular), pelvic pain and vaginal bleeding. Negative for dysuria.   Musculoskeletal:  Positive for back pain.   Skin:  Negative for rash.   Neurological:  Positive for light-headedness.     Physical Exam     Initial Vitals [11/08/22 2220]   BP Pulse Resp Temp SpO2   (!) 140/82 84 16 98.1 °F (36.7 °C) 97 %      MAP       --         Physical Exam    Nursing note and vitals reviewed.  Constitutional: She appears well-developed and well-nourished. She is not diaphoretic.   Awake, alert adolescent female.   HENT:   Head: Normocephalic and atraumatic.   Mouth/Throat: Oropharynx is clear and moist.   Eyes: Conjunctivae and EOM are normal. Pupils are equal, round, and reactive to light.   Neck: Neck supple.   Normal range of motion.  Cardiovascular:  Normal rate, regular rhythm and intact distal pulses.           No murmur heard.  Pulmonary/Chest: Breath sounds normal. No respiratory distress. She has no wheezes. She has no rhonchi. She has no rales.   Abdominal: Abdomen is soft. There is abdominal tenderness (suprapubic). There is no rebound and no guarding.   Musculoskeletal:         General: No tenderness or edema. Normal range of motion.      Cervical back: Normal range of motion and neck supple.     Neurological: She is alert and oriented to person, place, and time. She has normal strength.   Moving all extremities.   Skin: Skin is warm and dry. No erythema. No pallor.   Psychiatric: She has a normal mood and affect.       ED Course   Procedures  Labs Reviewed   URINALYSIS, REFLEX TO URINE CULTURE - Abnormal; Notable for the following components:       Result Value    Occult Blood UA 3+ (*)     All other components within normal limits    Narrative:     Specimen Source->Urine   URINALYSIS MICROSCOPIC - Abnormal; Notable for the following components:    RBC, UA 75 (*)      Bacteria Few (*)     All other components within normal limits    Narrative:     Specimen Source->Urine   CBC W/ AUTO DIFFERENTIAL - Abnormal; Notable for the following components:    Gran % 75.4 (*)     Lymph % 14.1 (*)     All other components within normal limits   COMPREHENSIVE METABOLIC PANEL - Abnormal; Notable for the following components:    CO2 22 (*)     Anion Gap 7 (*)     All other components within normal limits   HIV 1 / 2 ANTIBODY    Narrative:     Release to patient->Immediate   HEPATITIS C ANTIBODY    Narrative:     Release to patient->Immediate   LIPASE   POCT URINE PREGNANCY          Imaging Results    None          Medications   sodium chloride 0.9% bolus 1,000 mL (0 mLs Intravenous Stopped 11/9/22 0245)   ketorolac injection 30 mg (30 mg Intravenous Given 11/9/22 0135)   ondansetron injection 4 mg (4 mg Intravenous Given 11/9/22 0136)   calcium carbonate 200 mg calcium (500 mg) chewable tablet 1,000 mg (1,000 mg Oral Given 11/9/22 0246)     Medical Decision Making:   History:   Old Medical Records: I decided to obtain old medical records.  Old Records Summarized: records from previous admission(s) and records from clinic visits.  Initial Assessment:   22 y.o. female with pelvic pain, nausea/vomiting/diarrhea, heavy menses.  Differential Diagnosis:   Differential includes menorrhagia, PCOS, endometriosis, pregnancy, ectopic pregnancy, UTI, pyelonephritis, STI, PID, TOA, torsion, other.   Clinical Tests:   Lab Tests: Ordered and Reviewed  ED Management:  UPT negative.     CBC reassuring -- not anemic despite heavy menses. Other labs also reassuring (CMP, lipase, UA).    I have strongly advised patient to f/u back to OB/GYN for dysmenorrhea.  She felt better after IV toradol 30mg, IV zofran 4mg, IV NS 1L, and later PO Tums 1g in ER.  I have refilled PRN Ibuprofen and Zofran.  D/c'ed, improved condition, VSS.                         Clinical Impression:   Final diagnoses:  [N94.6] Dysmenorrhea  (Primary)  [R10.30] Lower abdominal pain  [R10.2] Pelvic pain  [N92.1] Menorrhagia with irregular cycle  [R11.2, R19.7] Nausea vomiting and diarrhea  [M54.9] Back pain, unspecified back location, unspecified back pain laterality, unspecified chronicity      ED Disposition Condition    Discharge Stable          ED Prescriptions       Medication Sig Dispense Start Date End Date Auth. Provider    ibuprofen (ADVIL,MOTRIN) 600 MG tablet  (Status: Discontinued) Take 1 tablet (600 mg total) by mouth every 6 (six) hours as needed for Pain. 201 tablet 11/9/2022 11/9/2022 Fanny Osullivan MD    ibuprofen (ADVIL,MOTRIN) 600 MG tablet Take 1 tablet (600 mg total) by mouth every 6 (six) hours as needed for Pain. 20 tablet 11/9/2022 -- Fanny Osullivan MD    ondansetron (ZOFRAN-ODT) 4 MG TbDL Take 1 tablet (4 mg total) by mouth every 4 (four) hours as needed (nausea). 20 tablet 11/9/2022 -- Fanny Osullivan MD          Follow-up Information       Follow up With Specialties Details Why Contact Info    [place] NP Obstetrics and Gynecology   [address] [address]  [phone]      [facility]   [address] [address] [address]  [phone]             Fanny Osullivan MD  11/10/22 0336

## 2022-11-18 ENCOUNTER — OFFICE VISIT (OUTPATIENT)
Dept: PRIMARY CARE CLINIC | Facility: CLINIC | Age: 22
End: 2022-11-18
Payer: COMMERCIAL

## 2022-11-18 ENCOUNTER — PATIENT MESSAGE (OUTPATIENT)
Dept: PRIMARY CARE CLINIC | Facility: CLINIC | Age: 22
End: 2022-11-18

## 2022-11-18 VITALS — BODY MASS INDEX: 24.25 KG/M2 | HEIGHT: 68 IN | WEIGHT: 160 LBS

## 2022-11-18 DIAGNOSIS — G47.10 EXCESSIVE SLEEPINESS: Primary | ICD-10-CM

## 2022-11-18 PROCEDURE — 99213 PR OFFICE/OUTPT VISIT, EST, LEVL III, 20-29 MIN: ICD-10-PCS | Mod: 95,,, | Performed by: INTERNAL MEDICINE

## 2022-11-18 PROCEDURE — 99213 OFFICE O/P EST LOW 20 MIN: CPT | Mod: 95,,, | Performed by: INTERNAL MEDICINE

## 2022-11-18 RX ORDER — CETIRIZINE HYDROCHLORIDE 10 MG/1
10 TABLET ORAL DAILY
Qty: 30 TABLET | Refills: 5 | Status: SHIPPED | OUTPATIENT
Start: 2022-11-18

## 2022-11-18 RX ORDER — CIPROFLOXACIN AND DEXAMETHASONE 3; 1 MG/ML; MG/ML
SUSPENSION/ DROPS AURICULAR (OTIC)
COMMUNITY
Start: 2022-09-16 | End: 2022-12-05

## 2022-11-18 RX ORDER — BUPROPION HYDROCHLORIDE 150 MG/1
150 TABLET ORAL DAILY
COMMUNITY
Start: 2022-10-27

## 2022-11-18 NOTE — PROGRESS NOTES
"  [facility] Progress Note  11/18/2022    This was a virtual visit conducted via webcam.      Reason for Visit:      had concerns including sleepiness (Symptoms started a year also//Patient states that she very tired all the time /Starting to affect some things in her life/) and Weight Loss (Pt states  that she lost abut 30 lbs in 2 months).      History of Present Illness:     Pt is seen today to discuss excessive sleepiness.   She sent a narrative describing her symptoms which is pasted below.  In short, has had long history of feeling tired all of the time despite sleeping adequate duration at night.  Does not wake up refreshed.  Falls asleep easily during day at inappropriate times.  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  She would like referral to sleep medicine for evaluation for narcolepsy.  Also has symptoms that sound c/w sleep paralysis.    Recent labs were unremarkable    Patien'ts narrative pasted below:  ESS Score - (16)  SNS Score - (-68)  I experience an inconsistent ability (even with exercise, food, herbal tea, and sleep  supplements). I sleep almost consistently 10 hours at a time and have unexpected naps  throughout the day.  I feel bad and not well-rested in the morning  I have experienced sudden unexplained weight loss  Sleeping way too much, even on accident  It's happened since around when I turned 21 and kept getting more noticeable. I would fall  asleep or get really weak, tired, or confused at work and it messed things up. I feel like I have to  take intentional naps and sleep over 10 hours a night, get caffeine, and use wakefulness aids to  make the sleeping spells happen less. I often have to take caffeine tablets and have a rubber  band on my wrist that I can snap to feel somewhat alert, or at least a little energized " for a  moment.  Taking naps excessively during the day  Weak knees/buckling of the knees during emotions such as laughing, happiness, or anger  Sagging of the jaw during emotions such as laughing, happiness, or anger  I fall asleep standing up and lose muscle control and they don't feel strong or controllable,  especially if I'm especially anxious, excited, or frustrated. It's like I can't move on my own will.  It's like I become frozen, my words and my body and I fall. My head shakes a bit and I snore for  a second. Sometimes I wake up quickly. Sometimes I don't.  I keep collapsing when I'm standing, it was embarrassing and it happened in front of someone I  really wanted to impress, and I started laughing and I ended up on the floor. This has happened  at my dorm too with my girlfriend.  I oftentimes rely on asynchronous classwork, textbooks, and assignments because of fatigue.  My campus provider said it is important to get it checked out but doesn't want to pursue  anything other than antidepressants. Tizanidine (4mg/prn/15 pills) was given to me about 3  weeks ago and has helped with the shaking and pain a little bit. But, I'm almost out. I have 3 in  the bottle at the moment. It makes the sleepiness worse, but I don't feel shaky or at risk of  collapsing. I just feel heavier, and a bit sleepier when I lay down. Like a weighted blanket. It  helped the pain that I felt in my lower back, which I suspected may have been an older sports  injury become re-inflamed.  I get scary sleep paralysis and sometimes feel like I'm hallucinating at times. I feel so zoned out  and detached, rather consistently. My joints are hypermobile and I was born ~3 months early  with a twin sister. I'm prone to ear infections. I fall asleep on the floor sometimes by accident.. I'll  fall and just feel super weak, but then I just kind of... Stay there.  I overcompensated by walking/bussing my way to work with the [employer] this summer  "andconsistently came home, even after a very long break, feeling exhausted and falling asleep  mid-conversation. Even though I was excited about the work I would zone out, fall asleep, and  just be less engaged with my life. I attributed the pain and exhaustion to be gynecological, but  Endometriosis was ruled out by my OBGYN after ultrasounds and repeat bloodwork.  The ultrasound was fine, but what was weird was my rapid weight gain/loss and fatigue  symptoms were largely inconclusive and I felt like the pain, issues with exhaustion, sleeping  spells, weakness, and lack of attentiveness were all my fault. Like I should have prevented all of  this from happening. She just wanted to deal with it "later down the road."  I provided documentation and provided clarence details about my exhaustion, period irregularity  and severity, feeling disengaged, frequent naps, sleeping spells, and depression. This was done  on paper and verbally in person during my appointment because I wanted to make sure that I  was descriptive, exact, and as concise as possible. I was told to "lose weight and come back,"  so to speak. I've taken that seriously. I believe that I weigh approximately 160 lbs or a bit lower  now. I weigh in once my new scale from [employer] comes I'll know exactly, but my clothes are  looser, my jawline and cheeks are more defined, and my appetite is fractional to what it once  was when I was a little bit larger.  My symptoms are still worsening, and it feels like it's accelerating. I thought that if I took the  multivitamins, ate the vegetables, and walked everywhere that I could overcome it. I didn't. I  have felt like I can't be productive, I sleep so much, and my back hurts      Problem List:     Problem List:  2022-07: PCOS (polycystic ovarian syndrome)        Medications:       Current Outpatient Medications:     acetaminophen (TYLENOL) 325 MG tablet, Take 325 mg by mouth every 6 (six) hours as needed for Pain., " Disp: , Rfl:     ibuprofen (ADVIL,MOTRIN) 600 MG tablet, Take 1 tablet (600 mg total) by mouth every 6 (six) hours as needed for Pain., Disp: 20 tablet, Rfl: 1    ondansetron (ZOFRAN-ODT) 4 MG TbDL, Take 1 tablet (4 mg total) by mouth every 4 (four) hours as needed (nausea)., Disp: 20 tablet, Rfl: 1    buPROPion (WELLBUTRIN XL) 150 MG TB24 tablet, Take 150 mg by mouth once daily., Disp: , Rfl:     cetirizine (ZYRTEC) 10 MG tablet, Take 1 tablet (10 mg total) by mouth once daily., Disp: 30 tablet, Rfl: 5    ciprofloxacin-dexAMETHasone 0.3-0.1% (CIPRODEX) 0.3-0.1 % DrpS, SMARTSIG:3-4 Drop(s) In Ear(s) Twice Daily, Disp: , Rfl:     drospirenone-ethinyl estradioL (LORYNA, 28,) 3-0.02 mg per tablet, TAKE 1 TABLET BY MOUTH EVERY DAY, Disp: 84 tablet, Rfl: 0    DULoxetine 60 mg CDRS, , Disp: , Rfl:       Review of Systems:     Review of Systems   Constitutional:  Positive for unexpected weight change. Negative for activity change.   HENT:  Negative for hearing loss, rhinorrhea and trouble swallowing.    Eyes:  Negative for discharge and visual disturbance.   Respiratory:  Negative for chest tightness and wheezing.    Cardiovascular:  Negative for chest pain and palpitations.   Gastrointestinal:  Positive for vomiting. Negative for blood in stool, constipation and diarrhea.   Endocrine: Negative for polydipsia and polyuria.   Genitourinary:  Positive for menstrual problem. Negative for difficulty urinating and hematuria.   Musculoskeletal:  Positive for arthralgias and joint swelling. Negative for neck pain.   Neurological:  Positive for weakness and headaches.   Psychiatric/Behavioral:  Positive for confusion and dysphoric mood.          Physical Exam:     Pt is alert and oriented.  Speech is clear and coherent.  Speaking in complete sentences.  No distress.  Mood and affect are normal.      Labs/Imaging/Testing:     Most recent CBC:  Lab Results   Component Value Date    WBC 10.04 11/09/2022    HGB 13.1 11/09/2022    PLT  354 11/09/2022    MCV 86 11/09/2022       Most recent Chemistry:  Lab Results   Component Value Date     11/09/2022    K 4.0 11/09/2022     11/09/2022    CO2 22 (L) 11/09/2022    BUN 8 11/09/2022    CREATININE 0.8 11/09/2022    GLU 99 11/09/2022    CALCIUM 9.4 11/09/2022    ALT 30 11/09/2022    AST 23 11/09/2022    ALKPHOS 79 11/09/2022    PROT 7.8 11/09/2022    ALBUMIN 4.0 11/09/2022       Other tests:  Lab Results   Component Value Date    TSH 0.778 07/20/2022    FREET4 0.86 07/20/2022    HGBA1C 5.1 07/20/2022    LIPASE 14 11/09/2022             Assessment and Plan:     1. Excessive sleepiness  - Ambulatory referral/consult to Sleep Disorders; Future  Will refer to sleep medicine to evaluate for narcolepsy        Yon Wiley MD  11/18/2022    This note was prepared using voice-recognition software.  Although efforts are made to proofread the note, some errors may persist in the final document.    Dr. Wiley's [place] License number is [id number]  This was a virtual (audio/video visit) in lieu of in-person visit in context of the coronavirus emergency.      Patient/Family members identity was confirmed, and confidentiality/privacy confirmed prior to visit. Verbal informed consent was obtained from the patient's legal guardian or patient when appropriate to conduct this virtual visit.  They authorized me to provide medical care and voiced understanding of the risks, benefits, and alternatives of virtual care.  Guardian understands the limitations inherent of a virtual visit, that they may choose to be seen in person if desired or needed, and that they may halt the virtual visit at any time for any reason.     Originating Site: Patient's home   Distant Site:  [facility]     I certify that this visit was done via secure two-way simultaneous audio and video transmission with informed consent of the patient and/or guardian. Over 50% of the time was counseling or coordinating care.

## 2022-12-05 ENCOUNTER — OFFICE VISIT (OUTPATIENT)
Dept: SLEEP MEDICINE | Facility: CLINIC | Age: 22
End: 2022-12-05
Payer: COMMERCIAL

## 2022-12-05 VITALS
SYSTOLIC BLOOD PRESSURE: 132 MMHG | HEART RATE: 81 BPM | HEIGHT: 68 IN | DIASTOLIC BLOOD PRESSURE: 86 MMHG | BODY MASS INDEX: 24.26 KG/M2 | WEIGHT: 160.06 LBS

## 2022-12-05 DIAGNOSIS — G47.8 SLEEP PARALYSIS: ICD-10-CM

## 2022-12-05 DIAGNOSIS — G47.10 EXCESSIVE SLEEPINESS: ICD-10-CM

## 2022-12-05 DIAGNOSIS — R06.83 SNORING: Primary | ICD-10-CM

## 2022-12-05 DIAGNOSIS — R44.2 HALLUCINATION, HYPNOPOMPIC: ICD-10-CM

## 2022-12-05 PROCEDURE — 99999 PR PBB SHADOW E&M-EST. PATIENT-LVL III: ICD-10-PCS | Mod: PBBFAC,,, | Performed by: INTERNAL MEDICINE

## 2022-12-05 PROCEDURE — 99204 PR OFFICE/OUTPT VISIT, NEW, LEVL IV, 45-59 MIN: ICD-10-PCS | Mod: S$GLB,,, | Performed by: INTERNAL MEDICINE

## 2022-12-05 PROCEDURE — 99999 PR PBB SHADOW E&M-EST. PATIENT-LVL III: CPT | Mod: PBBFAC,,, | Performed by: INTERNAL MEDICINE

## 2022-12-05 PROCEDURE — 99204 OFFICE O/P NEW MOD 45 MIN: CPT | Mod: S$GLB,,, | Performed by: INTERNAL MEDICINE

## 2022-12-05 NOTE — PROGRESS NOTES
"  Referred by Yon iWley MD     NEW PATIENT VISIT    Rosalva Beal  is a pleasant 22 y.o. female  with PMH significant for PCOS who presents for sleepiness during the day , sleep paralysis, mild snoring.    SLEEP SCHEDULE   Environment    Bed Time 10-MN   Sleep Latency Not long   Arousals 1   Nocturia    Back to sleep Not long   Wake time 6-9 AM   Naps 2-3   Work        No past medical history on file.  Patient Active Problem List   Diagnosis    PCOS (polycystic ovarian syndrome)       Current Outpatient Medications:     acetaminophen (TYLENOL) 325 MG tablet, Take 325 mg by mouth every 6 (six) hours as needed for Pain., Disp: , Rfl:     buPROPion (WELLBUTRIN XL) 150 MG TB24 tablet, Take 150 mg by mouth once daily., Disp: , Rfl:     cetirizine (ZYRTEC) 10 MG tablet, Take 1 tablet (10 mg total) by mouth once daily., Disp: 30 tablet, Rfl: 5    ciprofloxacin-dexAMETHasone 0.3-0.1% (CIPRODEX) 0.3-0.1 % DrpS, SMARTSIG:3-4 Drop(s) In Ear(s) Twice Daily, Disp: , Rfl:     drospirenone-ethinyl estradioL (LORYNA, 28,) 3-0.02 mg per tablet, TAKE 1 TABLET BY MOUTH EVERY DAY, Disp: 84 tablet, Rfl: 0    DULoxetine 60 mg CDRS, , Disp: , Rfl:     ibuprofen (ADVIL,MOTRIN) 600 MG tablet, Take 1 tablet (600 mg total) by mouth every 6 (six) hours as needed for Pain., Disp: 20 tablet, Rfl: 1    ondansetron (ZOFRAN-ODT) 4 MG TbDL, Take 1 tablet (4 mg total) by mouth every 4 (four) hours as needed (nausea)., Disp: 20 tablet, Rfl: 1       Vitals:    12/05/22 1644   BP: 132/86   BP Location: Left arm   Patient Position: Sitting   BP Method: Large (Automatic)   Pulse: 81   Weight: 72.6 kg (160 lb 0.9 oz)   Height: 5' 8" (1.727 m)     Physical Exam:    GEN:   Well-appearing  Psych:  Appropriate affect, demonstrates insight  SKIN:  No rash on the face or bridge of the nose      LABS:   Lab Results   Component Value Date    HGB 13.1 11/09/2022    CO2 22 (L) 11/09/2022       RECORDS REVIEWED PREVIOUSLY:    No prior sleep " testing.    ASSESSMENT      PROBLEM DESCRIPTION/ Sx on Presentation  STATUS   Screening for  BETO   + mild snoring recently, occasional sudden arousals, no witnessed apneas     New   Daytime Sx   Some sleepiness for several years  Worse particularly in the past year or so  + sleepiness when inactive   + H/H hallucinations, frequently hears sounds (bells chiming) on waking up, occasionally sees people walking across her field of vision  + frequent sleep paralysis almost nightly  + sleep attacks about 3-4 times per week  Naps somewhat refreshing  At least 1 hour every day    ESS 17/24 on intake  New   Cataplexy Weakness in her arms, mouth with strong emotions (with laughter)  Occasional falls with anger or stess     Other issues:     PLAN     -recommend sleep testing (will screen for BETO)  --will pursue PSG with MSLT out of concern for central hypersomnia in light of   +sleep paralysis and HH hallucinations  +sleep attacks   +difficulty waking up in the morning  +severe sleepiness (ESS>10) going on for more than 2 months despite adequate sleep time,   +no medications or medical conditions likely to cause sleepiness    RTC after testing         The patient was given open opportunity to ask questions and/or express concerns about treatment plan.   All questions/concerns were discussed.     Two patient identifiers used prior to evaluation.

## 2022-12-12 ENCOUNTER — TELEPHONE (OUTPATIENT)
Dept: SLEEP MEDICINE | Facility: OTHER | Age: 22
End: 2022-12-12
Payer: COMMERCIAL

## 2022-12-12 ENCOUNTER — PATIENT MESSAGE (OUTPATIENT)
Dept: SLEEP MEDICINE | Facility: CLINIC | Age: 22
End: 2022-12-12
Payer: COMMERCIAL

## 2022-12-16 ENCOUNTER — PATIENT MESSAGE (OUTPATIENT)
Dept: SLEEP MEDICINE | Facility: CLINIC | Age: 22
End: 2022-12-16

## 2022-12-16 PROBLEM — G47.10 EXCESSIVE SLEEPINESS: Status: ACTIVE | Noted: 2022-12-16

## 2022-12-22 DIAGNOSIS — G47.33 OSA (OBSTRUCTIVE SLEEP APNEA): Primary | ICD-10-CM

## 2023-01-17 ENCOUNTER — PATIENT MESSAGE (OUTPATIENT)
Dept: SLEEP MEDICINE | Facility: CLINIC | Age: 23
End: 2023-01-17
Payer: MEDICAID

## 2023-02-02 ENCOUNTER — OFFICE VISIT (OUTPATIENT)
Dept: ENDOCRINOLOGY | Facility: CLINIC | Age: 23
End: 2023-02-02
Payer: COMMERCIAL

## 2023-02-02 VITALS
BODY MASS INDEX: 34.01 KG/M2 | HEART RATE: 90 BPM | OXYGEN SATURATION: 99 % | SYSTOLIC BLOOD PRESSURE: 130 MMHG | WEIGHT: 224.44 LBS | HEIGHT: 68 IN | DIASTOLIC BLOOD PRESSURE: 84 MMHG

## 2023-02-02 DIAGNOSIS — E28.2 PCOS (POLYCYSTIC OVARIAN SYNDROME): ICD-10-CM

## 2023-02-02 PROCEDURE — 1159F MED LIST DOCD IN RCRD: CPT | Mod: CPTII,S$GLB,, | Performed by: INTERNAL MEDICINE

## 2023-02-02 PROCEDURE — 3075F PR MOST RECENT SYSTOLIC BLOOD PRESS GE 130-139MM HG: ICD-10-PCS | Mod: CPTII,S$GLB,, | Performed by: INTERNAL MEDICINE

## 2023-02-02 PROCEDURE — 99204 PR OFFICE/OUTPT VISIT, NEW, LEVL IV, 45-59 MIN: ICD-10-PCS | Mod: S$GLB,,, | Performed by: INTERNAL MEDICINE

## 2023-02-02 PROCEDURE — 99999 PR PBB SHADOW E&M-EST. PATIENT-LVL III: CPT | Mod: PBBFAC,,, | Performed by: INTERNAL MEDICINE

## 2023-02-02 PROCEDURE — 99999 PR PBB SHADOW E&M-EST. PATIENT-LVL III: ICD-10-PCS | Mod: PBBFAC,,, | Performed by: INTERNAL MEDICINE

## 2023-02-02 PROCEDURE — 1159F PR MEDICATION LIST DOCUMENTED IN MEDICAL RECORD: ICD-10-PCS | Mod: CPTII,S$GLB,, | Performed by: INTERNAL MEDICINE

## 2023-02-02 PROCEDURE — 3008F BODY MASS INDEX DOCD: CPT | Mod: CPTII,S$GLB,, | Performed by: INTERNAL MEDICINE

## 2023-02-02 PROCEDURE — 3079F DIAST BP 80-89 MM HG: CPT | Mod: CPTII,S$GLB,, | Performed by: INTERNAL MEDICINE

## 2023-02-02 PROCEDURE — 3075F SYST BP GE 130 - 139MM HG: CPT | Mod: CPTII,S$GLB,, | Performed by: INTERNAL MEDICINE

## 2023-02-02 PROCEDURE — 99204 OFFICE O/P NEW MOD 45 MIN: CPT | Mod: S$GLB,,, | Performed by: INTERNAL MEDICINE

## 2023-02-02 PROCEDURE — 3079F PR MOST RECENT DIASTOLIC BLOOD PRESSURE 80-89 MM HG: ICD-10-PCS | Mod: CPTII,S$GLB,, | Performed by: INTERNAL MEDICINE

## 2023-02-02 PROCEDURE — 3008F PR BODY MASS INDEX (BMI) DOCUMENTED: ICD-10-PCS | Mod: CPTII,S$GLB,, | Performed by: INTERNAL MEDICINE

## 2023-02-02 RX ORDER — DULOXETIN HYDROCHLORIDE 20 MG/1
CAPSULE, DELAYED RELEASE ORAL
COMMUNITY
Start: 2020-02-02

## 2023-02-02 RX ORDER — ALBUTEROL SULFATE 0.83 MG/ML
SOLUTION RESPIRATORY (INHALATION)
COMMUNITY
Start: 2022-12-21

## 2023-02-02 RX ORDER — DEXAMETHASONE 1 MG/1
1 TABLET ORAL ONCE
Qty: 1 TABLET | Refills: 0 | Status: SHIPPED | OUTPATIENT
Start: 2023-02-02 | End: 2023-02-02

## 2023-02-02 RX ORDER — EPINEPHRINE 0.3 MG/.3ML
INJECTION SUBCUTANEOUS
COMMUNITY
Start: 2022-12-23

## 2023-02-02 NOTE — PATIENT INSTRUCTIONS
Here are the instructions for the dexamethasone suppression test.  Please take 1 mg dexamethasone between 11 PM-12 AM. Then have your blood drawn at 8-9 AM the next morning.    I have sent the prescription of dexamethasone to your pharmacy and entered the blood tests for you.       Two separate lab appointments.  1) first appointment for 8 am cortisol     2) The second appointment is for the remaining tests  (do it after you have had a menstrual bleed)    Goal weight loss 5% body weight ( 10 lbs)    Weigh yourself daily    Simple plan: avoid anything made with flour or sugar.     Increase vegetables, lean proteins and limit carbohydrates.     Choose high fiber carbohydrates, that is a carbohydrate that has more than 3 - 5 grams of fiber per serving. Examples of starchy foods that are good for you are beans, squash.      Drink plenty of water and avoid drinks with sugar such as regular sodas.     No snacking   Intermittent fasting     Books:  How to lose weight for the last time, Dr. Luz Elena Marcus

## 2023-02-02 NOTE — ASSESSMENT & PLAN NOTE
Diagnostic criteria:  1) irregular menstrual cycles  2) does not appear to have PCOM   3) recently shaved FG 0    Need to exclude: PRL, 17 OHP and TSH   Will also check DST 1 mg at bedtime with 8 AM cortisol the following day    Adhering to lifestyle modifications   Needs time to see effectiveness   Consider OGTT/insulin at f/u visit

## 2023-02-02 NOTE — PROGRESS NOTES
Subjective:      Patient ID: Rosalva Beal is a 22 y.o. female.    Chief Complaint:  Consult      History of Present Illness    Rosalva Beal is a 22 year old woman who is here for evaluation of:    1) increased hear growth recently   2) weight gain that is difficulty to manage   3) skin changes in axilla  4) menstrual cycles irregularity  5) feeling very stressed   6) prescribed OCPs but developed suicidally -- stopped   7) hair loss -- using minoxidil and responding well     Menarchal history:  Menarche occurred at 14 years of age.   Postmenarche cycles remained irregular   6 or less cycles per year  LMP 12/22/22 - heavy bleed     Use of contraception:  OCP: Loryna    Hyperandrogenism:  Reports increased hair, not dark or coarse (baby hair over chin, jawline)  Reports increased dark, coarse hair over abdomen/pelvic region.   (Also arms and legs)    She removes terminal hair by shave.     Ultrasound of the ovaries in the past demonstrates normal uterus/ovaries.     Metabolic risk:  Denies hyperlipidemia  Denies history of hypertension or elevated blood pressure.   Denies history of insulin resistance or preDM/DM.   Denies history of fatty liver  Suspected narcolepsy     Has lost about ten pounds in this past month. Eating really poorly: eating more sugar, eating at irregular times.    Current exercise regimen includes:  Started three months --> fast walking three times a week (45 min)    Denies any history of galactorrhea, headaches. Reports myopia.      Review of Systems  Denies recent illness     Objective:   Physical Exam  Constitutional:       General: She is not in acute distress.     Appearance: Normal appearance. She is well-developed.   Eyes:      General: No scleral icterus.     Extraocular Movements: Extraocular movements intact.      Conjunctiva/sclera: Conjunctivae normal.      Pupils: Pupils are equal, round, and reactive to light.      Comments: No proptosis.    Neck:      Thyroid: No  "thyromegaly.      Trachea: No tracheal deviation.   Cardiovascular:      Rate and Rhythm: Normal rate.   Pulmonary:      Effort: Pulmonary effort is normal.      Breath sounds: Normal breath sounds.   Musculoskeletal:      Cervical back: Normal range of motion and neck supple.   Lymphadenopathy:      Cervical: No cervical adenopathy.   Skin:     General: Skin is warm and dry.      Findings: No rash.      Comments: Striae over arms, abdomen and axilla   No skin tags   No bruising   No to min acanthosis   Neurological:      Mental Status: She is alert.      Deep Tendon Reflexes: Reflexes are normal and symmetric.      Comments: No tremor.     Vitals:    02/02/23 1409   BP: 130/84   BP Location: Right arm   Patient Position: Sitting   BP Method: Large (Manual)   Pulse: 90   SpO2: 99%   Weight: 101.8 kg (224 lb 6.9 oz)   Height: 5' 8" (1.727 m)       BP Readings from Last 3 Encounters:   02/02/23 130/84   12/05/22 132/86   11/09/22 125/65     Wt Readings from Last 1 Encounters:   02/02/23 1409 101.8 kg (224 lb 6.9 oz)         Body mass index is 34.12 kg/m².    Lab Review:   Lab Results   Component Value Date    HGBA1C 5.1 07/20/2022     No results found for: CHOL, HDL, LDLCALC, TRIG, CHOLHDL  Lab Results   Component Value Date     11/09/2022    K 4.0 11/09/2022     11/09/2022    CO2 22 (L) 11/09/2022    GLU 99 11/09/2022    BUN 8 11/09/2022    CREATININE 0.8 11/09/2022    CALCIUM 9.4 11/09/2022    PROT 7.8 11/09/2022    ALBUMIN 4.0 11/09/2022    BILITOT 0.4 11/09/2022    ALKPHOS 79 11/09/2022    AST 23 11/09/2022    ALT 30 11/09/2022    ANIONGAP 7 (L) 11/09/2022    ESTGFRAFRICA >60 07/20/2022    EGFRNONAA >60 07/20/2022    TSH 0.778 07/20/2022         Assessment and Plan     PCOS (polycystic ovarian syndrome)  Diagnostic criteria:  1) irregular menstrual cycles  2) does not appear to have PCOM   3) recently shaved FG 0    Need to exclude: PRL, 17 OHP and TSH   Will also check DST 1 mg at bedtime with 8 AM " cortisol the following day    Adhering to lifestyle modifications   Needs time to see effectiveness   Consider OGTT/insulin at f/u visit       BMI 34.0-34.9,adult  See #1

## 2023-02-24 ENCOUNTER — LAB VISIT (OUTPATIENT)
Dept: LAB | Facility: OTHER | Age: 23
End: 2023-02-24
Attending: INTERNAL MEDICINE
Payer: COMMERCIAL

## 2023-02-24 DIAGNOSIS — E28.2 PCOS (POLYCYSTIC OVARIAN SYNDROME): ICD-10-CM

## 2023-02-24 LAB
CORTIS SERPL-MCNC: <1 UG/DL (ref 4.3–22.4)
PROLACTIN SERPL IA-MCNC: 22.9 NG/ML (ref 5.2–26.5)
TSH SERPL DL<=0.005 MIU/L-ACNC: 0.66 UIU/ML (ref 0.4–4)

## 2023-02-24 PROCEDURE — 36415 COLL VENOUS BLD VENIPUNCTURE: CPT | Performed by: INTERNAL MEDICINE

## 2023-02-24 PROCEDURE — 84146 ASSAY OF PROLACTIN: CPT | Performed by: INTERNAL MEDICINE

## 2023-02-24 PROCEDURE — 84270 ASSAY OF SEX HORMONE GLOBUL: CPT | Performed by: INTERNAL MEDICINE

## 2023-02-24 PROCEDURE — 84443 ASSAY THYROID STIM HORMONE: CPT | Performed by: INTERNAL MEDICINE

## 2023-02-24 PROCEDURE — 84403 ASSAY OF TOTAL TESTOSTERONE: CPT | Performed by: INTERNAL MEDICINE

## 2023-02-24 PROCEDURE — 83498 ASY HYDROXYPROGESTERONE 17-D: CPT | Performed by: INTERNAL MEDICINE

## 2023-02-24 PROCEDURE — 82533 TOTAL CORTISOL: CPT | Performed by: INTERNAL MEDICINE

## 2023-02-27 ENCOUNTER — PATIENT MESSAGE (OUTPATIENT)
Dept: ENDOCRINOLOGY | Facility: CLINIC | Age: 23
End: 2023-02-27
Payer: MEDICAID

## 2023-02-27 LAB — 17OHP SERPL-MCNC: 60 NG/DL (ref 35–413)

## 2023-03-02 LAB
ALBUMIN SERPL-MCNC: 4.9 G/DL (ref 3.6–5.1)
SHBG SERPL-SCNC: 21 NMOL/L (ref 17–124)
TESTOST FREE SERPL-MCNC: 3.4 PG/ML (ref 0.2–5)
TESTOST SERPL-MCNC: 22 NG/DL (ref 2–45)
TESTOSTERONE.FREE+WB SERPL-MCNC: 7.7 NG/DL (ref 0.5–8.5)

## 2023-07-11 RX ORDER — DROSPIRENONE AND ETHINYL ESTRADIOL TABLETS 0.02-3(28)
KIT ORAL
Qty: 84 TABLET | Refills: 0 | Status: SHIPPED | OUTPATIENT
Start: 2023-07-11 | End: 2023-12-01

## 2023-11-19 ENCOUNTER — PATIENT MESSAGE (OUTPATIENT)
Dept: ADMINISTRATIVE | Facility: OTHER | Age: 23
End: 2023-11-19
Payer: MEDICAID

## 2023-12-01 RX ORDER — DROSPIRENONE AND ETHINYL ESTRADIOL TABLETS 0.02-3(28)
KIT ORAL
Qty: 84 TABLET | Refills: 0 | Status: SHIPPED | OUTPATIENT
Start: 2023-12-01 | End: 2024-02-20

## 2023-12-04 ENCOUNTER — TELEPHONE (OUTPATIENT)
Dept: OBSTETRICS AND GYNECOLOGY | Facility: CLINIC | Age: 23
End: 2023-12-04
Payer: MEDICAID

## 2024-02-20 RX ORDER — DROSPIRENONE AND ETHINYL ESTRADIOL TABLETS 0.02-3(28)
KIT ORAL
Qty: 84 TABLET | Refills: 0 | Status: SHIPPED | OUTPATIENT
Start: 2024-02-20 | End: 2024-04-08

## 2024-03-13 ENCOUNTER — PATIENT MESSAGE (OUTPATIENT)
Dept: SLEEP MEDICINE | Facility: CLINIC | Age: 24
End: 2024-03-13
Payer: COMMERCIAL

## 2024-04-08 ENCOUNTER — ON-DEMAND VIRTUAL (OUTPATIENT)
Dept: URGENT CARE | Facility: CLINIC | Age: 24
End: 2024-04-08
Payer: COMMERCIAL

## 2024-04-08 DIAGNOSIS — J01.90 ACUTE NON-RECURRENT SINUSITIS, UNSPECIFIED LOCATION: Primary | ICD-10-CM

## 2024-04-08 DIAGNOSIS — H10.13 ALLERGIC CONJUNCTIVITIS OF BOTH EYES: ICD-10-CM

## 2024-04-08 DIAGNOSIS — J30.9 ALLERGIC RHINITIS, UNSPECIFIED SEASONALITY, UNSPECIFIED TRIGGER: ICD-10-CM

## 2024-04-08 PROCEDURE — 99213 OFFICE O/P EST LOW 20 MIN: CPT | Mod: 95,,, | Performed by: FAMILY MEDICINE

## 2024-04-08 RX ORDER — AMOXICILLIN AND CLAVULANATE POTASSIUM 875; 125 MG/1; MG/1
1 TABLET, FILM COATED ORAL EVERY 12 HOURS
Qty: 14 TABLET | Refills: 0 | Status: SHIPPED | OUTPATIENT
Start: 2024-04-08 | End: 2024-04-15

## 2024-04-08 RX ORDER — KETOTIFEN FUMARATE 0.35 MG/ML
1 SOLUTION/ DROPS OPHTHALMIC 2 TIMES DAILY
Qty: 10 ML | Refills: 0 | Status: SHIPPED | OUTPATIENT
Start: 2024-04-08 | End: 2024-05-08

## 2024-04-08 NOTE — PROGRESS NOTES
Subjective:      Patient ID: Rosalva Beal is a 23 y.o. female.    Vitals:  vitals were not taken for this visit.     Chief Complaint: No chief complaint on file.      Visit Type: TELE AUDIOVISUAL    Present with the patient at the time of consultation: TELEMED PRESENT WITH PATIENT: None    Past Medical History:   Diagnosis Date    Depression     Obesity      Past Surgical History:   Procedure Laterality Date    SHOULDER ARTHROSCOPY Right 2018     Review of patient's allergies indicates:   Allergen Reactions    Egg derived Anaphylaxis, Anxiety, Blisters, Dermatitis, Diarrhea, Edema, Hallucinations, Itching, Nausea And Vomiting, Palpitations, Photosensitivity, Rash, Shortness Of Breath, Swelling and Tinitus     Current Outpatient Medications on File Prior to Visit   Medication Sig Dispense Refill    albuterol (PROVENTIL) 2.5 mg /3 mL (0.083 %) nebulizer solution Take by nebulization.      buPROPion (WELLBUTRIN XL) 150 MG TB24 tablet Take 150 mg by mouth once daily.      cetirizine (ZYRTEC) 10 MG tablet Take 1 tablet (10 mg total) by mouth once daily. 30 tablet 5    EPINEPHrine (EPIPEN) 0.3 mg/0.3 mL AtIn SMARTSI.3 Milligram(s) IM Once PRN      [DISCONTINUED] acetaminophen (TYLENOL) 325 MG tablet Take 325 mg by mouth every 6 (six) hours as needed for Pain.      [DISCONTINUED] DULoxetine (CYMBALTA) 20 MG capsule       [DISCONTINUED] DULoxetine 60 mg CDRS       [DISCONTINUED] ibuprofen (ADVIL,MOTRIN) 600 MG tablet Take 1 tablet (600 mg total) by mouth every 6 (six) hours as needed for Pain. 20 tablet 1    [DISCONTINUED] LORYNA, 28, 3-0.02 mg per tablet TAKE 1 TABLET BY MOUTH EVERY DAY 84 tablet 0    [DISCONTINUED] ondansetron (ZOFRAN-ODT) 4 MG TbDL Take 1 tablet (4 mg total) by mouth every 4 (four) hours as needed (nausea). 20 tablet 1     No current facility-administered medications on file prior to visit.     Family History   Problem Relation Age of Onset    Hypertension Mother     Depression Mother      Birth defects Mother     Hypertension Father     Heart disease Father     Hearing loss Father     Miscarriages / Stillbirths Sister     Learning disabilities Brother     Hearing loss Paternal Grandmother     Hearing loss Paternal Grandfather        Medications Ordered                [id number] - [place], [place] - [address]   [address] [place] [address]    Telephone: [phone]   Fax: [phone]   Hours: Not open 24 hours                         E-Prescribed (2 of 2)              amoxicillin-clavulanate 875-125mg (AUGMENTIN) 875-125 mg per tablet    Sig: Take 1 tablet by mouth every 12 (twelve) hours. for 7 days       Start: 4/8/24     Quantity: 14 tablet Refills: 0                         ketotifen (ZADITOR) 0.025 % (0.035 %) ophthalmic solution    Sig: Place 1 drop into both eyes 2 (two) times daily.       Start: 4/8/24     Quantity: 10 mL Refills: 0                           Ohs Peq Odvv Intake    4/8/2024  2:59 PM CDT - Filed by Patient   What is your current physical address in the event of a medical emergency? [address] [place], [place]   Are you able to take your vital signs? No   Please attach any relevant images or files          HPI 23 years old female with history of sinus allergies presents with complaint of sinus congestion, sinus pressure/pain and itchy eyes for last 3 weeks.  States she has tried daily Zyrtec, without any relief.  The symptoms are gradually getting worse with increasing sinus pressure and yellowish nasal discharge.  Denies fever, cough.  ROS     Objective:   The physical exam was conducted virtually.  Physical Exam   Constitutional:  Non-toxic appearance. She does not appear ill. No distress.   HENT:   Nose: Congestion present.      Comments:   Positive bilateral maxillary and frontal sinus tenderness on patient's self-exam.  Pulmonary/Chest: No stridor. No respiratory distress. She has no wheezes.   Neurological: She is at baseline.        Assessment:     1. Acute non-recurrent sinusitis, unspecified location    2. Allergic rhinitis, unspecified seasonality, unspecified trigger    3. Allergic conjunctivitis of both eyes        Plan:   Discussed exam findings/diagnosis/plan with patient. Instructions regarding the visit diagnosis and management advised. Advised to f/u with PCP within 2-5 days. ER precautions given if symptoms get any worse. All questions answered. Patient verbally understood and agreed with treatment plan.      Acute non-recurrent sinusitis, unspecified location    Allergic rhinitis, unspecified seasonality, unspecified trigger    Allergic conjunctivitis of both eyes    Other orders  -     amoxicillin-clavulanate 875-125mg (AUGMENTIN) 875-125 mg per tablet; Take 1 tablet by mouth every 12 (twelve) hours. for 7 days  Dispense: 14 tablet; Refill: 0  -     ketotifen (ZADITOR) 0.025 % (0.035 %) ophthalmic solution; Place 1 drop into both eyes 2 (two) times daily.  Dispense: 10 mL; Refill: 0

## 2024-04-12 ENCOUNTER — PATIENT MESSAGE (OUTPATIENT)
Dept: ADMINISTRATIVE | Facility: OTHER | Age: 24
End: 2024-04-12
Payer: COMMERCIAL

## 2025-01-10 ENCOUNTER — OFFICE VISIT (OUTPATIENT)
Dept: URGENT CARE | Facility: CLINIC | Age: 25
End: 2025-01-10
Payer: COMMERCIAL

## 2025-01-10 VITALS
SYSTOLIC BLOOD PRESSURE: 142 MMHG | RESPIRATION RATE: 18 BRPM | HEART RATE: 89 BPM | HEIGHT: 68 IN | DIASTOLIC BLOOD PRESSURE: 88 MMHG | OXYGEN SATURATION: 99 % | BODY MASS INDEX: 33.95 KG/M2 | WEIGHT: 224 LBS | TEMPERATURE: 98 F

## 2025-01-10 DIAGNOSIS — R03.0 ELEVATED BLOOD PRESSURE READING: ICD-10-CM

## 2025-01-10 DIAGNOSIS — J01.90 ACUTE BACTERIAL SINUSITIS: Primary | ICD-10-CM

## 2025-01-10 DIAGNOSIS — B96.89 ACUTE BACTERIAL SINUSITIS: Primary | ICD-10-CM

## 2025-01-10 PROCEDURE — 99499 UNLISTED E&M SERVICE: CPT | Mod: S$GLB,,, | Performed by: NURSE PRACTITIONER

## 2025-01-10 RX ORDER — AMOXICILLIN AND CLAVULANATE POTASSIUM 875; 125 MG/1; MG/1
1 TABLET, FILM COATED ORAL 2 TIMES DAILY
Qty: 14 TABLET | Refills: 0 | Status: SHIPPED | OUTPATIENT
Start: 2025-01-10 | End: 2025-01-17

## 2025-01-10 NOTE — PROGRESS NOTES
"Subjective:      Patient ID: Rosalva Beal is a 24 y.o. female.    Vitals:  height is 5' 8" (1.727 m) and weight is 101.6 kg (224 lb). Her oral temperature is 98.1 °F (36.7 °C). Her blood pressure is 142/88 (abnormal) and her pulse is 89. Her respiration is 18 and oxygen saturation is 99%.     Chief Complaint: Otalgia    (Student) pt states that she does have some ringing in her ears, been going on for 5 days    Otalgia   There is pain in both ears. This is a new problem. The current episode started in the past 7 days. The problem occurs constantly. The problem has been gradually worsening. The pain is at a severity of 5/10. The pain is moderate. Associated symptoms include neck pain. Pertinent negatives include no abdominal pain, coughing, diarrhea, ear discharge, headaches, hearing loss, rash, rhinorrhea, sore throat or vomiting. She has tried acetaminophen for the symptoms. The treatment provided mild relief. There is no history of a chronic ear infection, hearing loss or a tympanostomy tube.       HENT:  Positive for ear pain. Negative for ear discharge, hearing loss and sore throat.    Neck: Positive for neck pain.   Respiratory:  Negative for cough.    Gastrointestinal:  Negative for abdominal pain, vomiting and diarrhea.   Skin:  Negative for rash.   Neurological:  Negative for headaches.      Objective:     Physical Exam   Constitutional: She is oriented to person, place, and time. She appears well-developed. She is cooperative.  Non-toxic appearance. She does not appear ill. No distress.   HENT:   Head: Normocephalic and atraumatic.   Ears:   Right Ear: Hearing, tympanic membrane, external ear and ear canal normal.   Left Ear: Hearing, tympanic membrane, external ear and ear canal normal.   Nose: Rhinorrhea and purulent discharge present. No mucosal edema or nasal deformity. No epistaxis. Right sinus exhibits maxillary sinus tenderness. Right sinus exhibits no frontal sinus tenderness. Left sinus " exhibits maxillary sinus tenderness. Left sinus exhibits no frontal sinus tenderness.   Mouth/Throat: Uvula is midline, oropharynx is clear and moist and mucous membranes are normal. No trismus in the jaw. Normal dentition. No uvula swelling. Cobblestoning present. No oropharyngeal exudate, posterior oropharyngeal edema or posterior oropharyngeal erythema.   Eyes: Conjunctivae and lids are normal. No scleral icterus.   Neck: Trachea normal and phonation normal. Neck supple. No edema present. No erythema present. No neck rigidity present.   Cardiovascular: Normal rate, regular rhythm, normal heart sounds and normal pulses.   Pulmonary/Chest: Effort normal and breath sounds normal. No respiratory distress. She has no decreased breath sounds. She has no rhonchi.   Abdominal: Normal appearance.   Musculoskeletal: Normal range of motion.         General: No deformity. Normal range of motion.   Neurological: She is alert and oriented to person, place, and time. She exhibits normal muscle tone. Coordination normal.   Skin: Skin is warm, dry, intact, not diaphoretic and not pale.   Psychiatric: Her speech is normal and behavior is normal. Judgment and thought content normal.   Nursing note and vitals reviewed.      Assessment:     1. Acute bacterial sinusitis    2. Elevated blood pressure reading        Plan:       Acute bacterial sinusitis  -     amoxicillin-clavulanate 875-125mg (AUGMENTIN) 875-125 mg per tablet; Take 1 tablet by mouth 2 (two) times daily. for 7 days  Dispense: 14 tablet; Refill: 0  -     Ambulatory referral/consult to Family Practice    Elevated blood pressure reading    -follow up with PCP              Patient Instructions   Start flonase daily. Follow up with PCP regarding your elevated blood pressure reading.       You must understand that you've received an Urgent Care treatment only and that you may be released before all your medical problems are known or treated. You, the patient, will arrange for  follow up care as instructed.  If your condition worsens we recommend that you receive another evaluation at the emergency room immediately or contact your primary medical clinics after hours call service to discuss your concerns.  Please return here or go to the Emergency Department for any concerns or worsening of condition.

## 2025-01-10 NOTE — PATIENT INSTRUCTIONS
Start flonase daily. Follow up with PCP regarding your elevated blood pressure reading.       You must understand that you've received an Urgent Care treatment only and that you may be released before all your medical problems are known or treated. You, the patient, will arrange for follow up care as instructed.  If your condition worsens we recommend that you receive another evaluation at the emergency room immediately or contact your primary medical clinics after hours call service to discuss your concerns.  Please return here or go to the Emergency Department for any concerns or worsening of condition.     No

## 2025-03-24 ENCOUNTER — HOSPITAL ENCOUNTER (EMERGENCY)
Facility: OTHER | Age: 25
Discharge: HOME | End: 2025-03-24
Attending: EMERGENCY MEDICINE
Payer: COMMERCIAL

## 2025-03-24 VITALS
DIASTOLIC BLOOD PRESSURE: 87 MMHG | HEART RATE: 83 BPM | RESPIRATION RATE: 16 BRPM | SYSTOLIC BLOOD PRESSURE: 134 MMHG | OXYGEN SATURATION: 99 % | TEMPERATURE: 99 F | BODY MASS INDEX: 34.86 KG/M2 | HEIGHT: 68 IN | WEIGHT: 230 LBS
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
## 2025-03-24 DIAGNOSIS — M54.50 ACUTE MIDLINE LOW BACK PAIN WITHOUT SCIATICA: Primary | ICD-10-CM

## 2025-03-24 LAB
B-HCG UR QL: NEGATIVE
CTP QC/QA: YES

## 2025-03-24 PROCEDURE — 25000003 PHARM REV CODE 250: Performed by: EMERGENCY MEDICINE

## 2025-03-24 PROCEDURE — 81025 URINE PREGNANCY TEST: CPT | Performed by: EMERGENCY MEDICINE

## 2025-03-24 PROCEDURE — 99283 EMERGENCY DEPT VISIT LOW MDM: CPT | Mod: 25

## 2025-03-24 RX ORDER — ACETAMINOPHEN 500 MG
1000 TABLET ORAL
Status: COMPLETED | OUTPATIENT
Start: 2025-03-24 | End: 2025-03-24

## 2025-03-24 RX ORDER — IBUPROFEN 800 MG/1
800 TABLET ORAL EVERY 6 HOURS PRN
Qty: 20 TABLET | Refills: 0 | Status: SHIPPED | OUTPATIENT
Start: 2025-03-24 | End: 2025-03-24

## 2025-03-24 RX ORDER — IBUPROFEN 800 MG/1
800 TABLET ORAL EVERY 8 HOURS PRN
Qty: 20 TABLET | Refills: 0 | Status: SHIPPED | OUTPATIENT
Start: 2025-03-24

## 2025-03-24 RX ORDER — IBUPROFEN 400 MG/1
800 TABLET ORAL
Status: COMPLETED | OUTPATIENT
Start: 2025-03-24 | End: 2025-03-24

## 2025-03-24 RX ORDER — LIDOCAINE 50 MG/G
1 PATCH TOPICAL
Status: DISCONTINUED | OUTPATIENT
Start: 2025-03-24 | End: 2025-03-25 | Stop reason: HOSPADM

## 2025-03-24 RX ADMIN — LIDOCAINE 1 PATCH: 50 PATCH CUTANEOUS at 09:03

## 2025-03-24 RX ADMIN — ACETAMINOPHEN 1000 MG: 500 TABLET ORAL at 09:03

## 2025-03-24 RX ADMIN — IBUPROFEN 800 MG: 400 TABLET, FILM COATED ORAL at 10:03
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
## 2025-04-03 ENCOUNTER — ON-DEMAND VIRTUAL (OUTPATIENT)
Dept: URGENT CARE | Facility: CLINIC | Age: 25
End: 2025-04-03
Payer: COMMERCIAL

## 2025-04-03 DIAGNOSIS — M54.50 ACUTE MIDLINE LOW BACK PAIN WITHOUT SCIATICA: Primary | ICD-10-CM

## 2025-04-03 RX ORDER — NAPROXEN 500 MG/1
500 TABLET ORAL 2 TIMES DAILY
Qty: 10 TABLET | Refills: 0 | Status: SHIPPED | OUTPATIENT
Start: 2025-04-03 | End: 2025-04-08

## 2025-04-03 NOTE — LETTER
April 3, 2025    Rosalva Beal  [address]  [facility] [address]             Virtual Visit - Urgent Care  Urgent Care  [address] [facility] [address]   April 3, 2025     Patient: Rosalva Beal   YOB: 2000   Date of Visit: 4/3/2025       To Whom it May Concern:    Rosalva Beal was seen virtually on 4/3/2025. She may return to school on 4/7/25 .    Please excuse her from any classes or work missed.    If you have any questions or concerns, please don't hesitate to call.    Sincerely,         Jenise Luna MD
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
